# Patient Record
Sex: FEMALE | Race: WHITE | NOT HISPANIC OR LATINO | Employment: FULL TIME | URBAN - METROPOLITAN AREA
[De-identification: names, ages, dates, MRNs, and addresses within clinical notes are randomized per-mention and may not be internally consistent; named-entity substitution may affect disease eponyms.]

---

## 2017-10-03 ENCOUNTER — APPOINTMENT (OUTPATIENT)
Dept: LAB | Facility: HOSPITAL | Age: 57
End: 2017-10-03
Payer: COMMERCIAL

## 2017-10-03 ENCOUNTER — TRANSCRIBE ORDERS (OUTPATIENT)
Dept: ADMINISTRATIVE | Facility: HOSPITAL | Age: 57
End: 2017-10-03

## 2017-10-03 DIAGNOSIS — E03.9 UNSPECIFIED HYPOTHYROIDISM: ICD-10-CM

## 2017-10-03 DIAGNOSIS — E03.9 UNSPECIFIED HYPOTHYROIDISM: Primary | ICD-10-CM

## 2017-10-03 LAB
T4 FREE SERPL-MCNC: 1.29 NG/DL (ref 0.76–1.46)
TSH SERPL DL<=0.05 MIU/L-ACNC: 0.02 UIU/ML (ref 0.36–3.74)

## 2017-10-03 PROCEDURE — 36415 COLL VENOUS BLD VENIPUNCTURE: CPT

## 2017-10-03 PROCEDURE — 84443 ASSAY THYROID STIM HORMONE: CPT

## 2017-10-03 PROCEDURE — 84439 ASSAY OF FREE THYROXINE: CPT

## 2017-12-14 DIAGNOSIS — Z12.31 ENCOUNTER FOR SCREENING MAMMOGRAM FOR MALIGNANT NEOPLASM OF BREAST: ICD-10-CM

## 2018-01-12 NOTE — PROGRESS NOTES
Assessment    1  Callus (700) (L84)   2  Acquired ankle/foot deformity (736 70) (M21 969)   3  Hallux valgus (735 0) (M20 10)   4  Pes planus, congenital (754 61) (Q66 50)   5  Onychomycosis (110 1) (B35 1)    Plan    · Terbinafine HCl - 250 MG Oral Tablet; one tab po qod   · Follow-up visit in 6 weeks Evaluation and Treatment  Follow-up  Status: Hold For -  Scheduling  Requested for: 65IYJ3669   · It is important to take good care of your feet ; Status:Complete;   Done: 11RGL2994  09:26AM   · You can treat and prevent ingrown toenails ; Status:Complete;   Done: 59IJZ2640  09:26AM   · Orthotics B/L; Status:Complete;   Done: 18LPC1036 09:26AM    Discussion/Summary  Possible side effects of new medications were reviewed with the patient/guardian today  The treatment plan was reviewed with the patient/guardian  The patient/guardian understands and agrees with the treatment plan   The patient was counseled regarding instructions for management, prognosis, patient and family education, risks and benefits of treatment options, importance of compliance with treatment  History of Present Illness  HPI: Patient is seen on referral  Patient has pain in her feet with ambulation  Patient is concerned with her bunions  In addition, patient is concerned with mycosis of right big toenail  Review of Systems    Constitutional: No fever, no chills, feels well, no tiredness, no recent weight gain or weight loss  Eyes: No complaints of eye pain, no red eyes, no eyesight problems, no discharge, no dry eyes, no itching of eyes  ENT: no complaints of earache, no loss of hearing, no nose bleeds, no nasal discharge, no sore throat, no hoarseness  Cardiovascular: No complaints of slow heart rate, no fast heart rate, no chest pain, no palpitations, no leg claudication, no lower extremity edema  Respiratory: No complaints of shortness of breath, no wheezing, no cough, no SOB on exertion, no orthopnea, no PND  Gastrointestinal: No complaints of abdominal pain, no constipation, no nausea or vomiting, no diarrhea, no bloody stools  Genitourinary: No complaints of dysuria, no incontinence, no pelvic pain, no dysmenorrhea, no vaginal discharge or bleeding  Musculoskeletal: No complaints of arthralgias, no myalgias, no joint swelling or stiffness, no limb pain or swelling  Active Problems    1  _ (V72 3)   2  Abnormal mammogram (793 80) (R92 8)   3  Benign essential hypertension (401 1) (I10)   4  Hypothyroidism (244 9) (E03 9)   5  Mixed hyperlipidemia (272 2) (E78 2)   6  Other screening mammogram (V76 12) (Z12 31)    Past Medical History    · History of hypertension (V12 59) (Z86 79)   · History of mixed hyperlipidemia (V12 29) (Z86 39)    Surgical History    · History of Oral Surgery Tooth Extraction    The surgical history was reviewed and updated today  Family History    · Family history of Mixed Hyperlipoproteinemia    The family history was reviewed and updated today  Social History    · Never A Smoker  The social history was reviewed and updated today  Current Meds   1  Yamilet 5-20 MG Oral Tablet; Take 1 tablet daily; Therapy: 53SIH0296 to (Last Rx:13Zuv6488)  Requested for: 92Pvm6550 Ordered   2  Fish Oil 1000 MG Oral Capsule; as directed; Therapy: 23YXG1026 to (Last Rx:22Nov2013) Ordered   3  Synthroid 125 MCG Oral Tablet (Levothyroxine Sodium); 1 Every Day; Therapy: 40Ixw7013 to  Requested for: 22Nov2013 Recorded    Allergies    1  No Known Drug Allergies   2  No Known Drug Allergies    Physical Exam  Left Foot: Appearance: Normal except as noted: excessive pronation and pes planus  Great toe deformities include a bunion  Tenderness: None except the great toe  Right Foot: Appearance: Normal except as noted: excessive pronation and pes planus  Great toe deformities include a bunion  Tenderness: None  Neurological Exam: performed  Light touch was intact bilaterally   Vibratory sensation was intact bilaterally  Response to monofilament test was intact bilaterally  Deep tendon reflexes: patellar reflex present bilaterally and achilles reflex present bilaterally  Vascular Exam: performed Dorsalis pedis pulses were present bilaterally  Posterior tibial pulses were present bilaterally  Elevation Pallor: absent bilaterally  Dependence rubor was absent bilaterally  Edema: none  Toenails: All of the toenails were elongated and hypertrophied  The right first toenail was shown to have subungual debris and Might  Hyperkeratosis: present on both first toes and present on both second sub metatarsals  Shoe Gear Evaluation: performed ()  Recommendation(s): custom inlays  Procedure  Patient was educated on the nature bunions and their care  Patient's feet were casted for custom molded foot orthotics  We will try this first prior to surgical intervention  An empiric course of terbinafine is been ordered  Distal be taken every other day        Future Appointments    Date/Time Provider Specialty Site   03/15/2016 01:15 PM Anais Galvin DPM [facility]iat[facility]     Signatures   Electronically signed by : Kenan Mast DPM; Feb  3 2016  9:27AM EST                       (Author)

## 2018-01-16 NOTE — PROGRESS NOTES
Assessment    1  Callus (700) (L84)   2  Acquired ankle/foot deformity (736 70) (M21 969)   3  Hallux valgus (735 0) (M20 10)   4  Pes planus, congenital (754 61) (Q66 50)   5  Onychomycosis (110 1) (B35 1)    Plan    · Terbinafine HCl - 250 MG Oral Tablet; one tab po qod   · Follow-up visit in 6 weeks Evaluation and Treatment  Follow-up  Status: Hold For -  Scheduling  Requested for: 51UEI0610   · It is important to take good care of your feet ; Status:Complete;   Done: 34GOO5008   · You can treat and prevent ingrown toenails ; Status:Complete;   Done: 24IJI6155   · Orthotics B/L; Status:Complete;   Done: 61YIR3960    Discussion/Summary  Possible side effects of new medications were reviewed with the patient/guardian today  The treatment plan was reviewed with the patient/guardian  The patient/guardian understands and agrees with the treatment plan   The patient was counseled regarding instructions for management, prognosis, patient and family education, risks and benefits of treatment options, importance of compliance with treatment  Chief Complaint  npt se scanned note      History of Present Illness  HPI: Patient is seen on referral  Patient has pain in her feet with ambulation  Patient is concerned with her bunions  In addition, patient is concerned with mycosis of right big toenail  Review of Systems    Constitutional: No fever, no chills, feels well, no tiredness, no recent weight gain or weight loss  Eyes: No complaints of eye pain, no red eyes, no eyesight problems, no discharge, no dry eyes, no itching of eyes  ENT: no complaints of earache, no loss of hearing, no nose bleeds, no nasal discharge, no sore throat, no hoarseness  Cardiovascular: No complaints of slow heart rate, no fast heart rate, no chest pain, no palpitations, no leg claudication, no lower extremity edema  Respiratory: No complaints of shortness of breath, no wheezing, no cough, no SOB on exertion, no orthopnea, no PND  Gastrointestinal: No complaints of abdominal pain, no constipation, no nausea or vomiting, no diarrhea, no bloody stools  Genitourinary: No complaints of dysuria, no incontinence, no pelvic pain, no dysmenorrhea, no vaginal discharge or bleeding  Musculoskeletal: No complaints of arthralgias, no myalgias, no joint swelling or stiffness, no limb pain or swelling  Active Problems    1  _ (V72 3)   2  Abnormal mammogram (793 80) (R92 8)   3  Acquired ankle/foot deformity (736 70) (M21 969)   4  Benign essential hypertension (401 1) (I10)   5  Callus (700) (L84)   6  Hallux valgus (735 0) (M20 10)   7  Hypothyroidism (244 9) (E03 9)   8  Mixed hyperlipidemia (272 2) (E78 2)   9  Onychomycosis (110 1) (B35 1)   10  Other screening mammogram (V76 12) (Z12 31)   11  Pes planus, congenital (754 61) (Q66 50)    Past Medical History    · History of hypertension (V12 59) (Z86 79)   · History of mixed hyperlipidemia (V12 29) (Z86 39)    Surgical History    · History of Oral Surgery Tooth Extraction    The surgical history was reviewed and updated today  Family History    · Family history of Mixed Hyperlipoproteinemia    The family history was reviewed and updated today  Social History    · Never A Smoker  The social history was reviewed and updated today  Current Meds   1  Yamilet 5-20 MG Oral Tablet; Take 1 tablet daily; Therapy: 48TMG1797 to (Last Rx:24Ghe8464)  Requested for: 75Hxk9864 Ordered   2  Fish Oil 1000 MG Oral Capsule; as directed; Therapy: 01DXX2254 to (Last Rx:22Nov2013) Ordered   3  Synthroid 125 MCG Oral Tablet; 1 Every Day; Therapy: 78Zsk7684 to  Requested for: 22Nov2013 Recorded    Allergies    1  No Known Drug Allergies   2  No Known Drug Allergies    Vitals   Recorded: 64AVK6739 09:28AM   Height 5 ft 5 in   Weight 152 lb    BMI Calculated 25 29   BSA Calculated 1 76     Physical Exam  Left Foot: Appearance: Normal except as noted: excessive pronation and pes planus   Great toe deformities include a bunion  Tenderness: None except the great toe  Right Foot: Appearance: Normal except as noted: excessive pronation and pes planus  Great toe deformities include a bunion  Tenderness: None  Neurological Exam: performed  Light touch was intact bilaterally  Vibratory sensation was intact bilaterally  Response to monofilament test was intact bilaterally  Deep tendon reflexes: patellar reflex present bilaterally and achilles reflex present bilaterally  Vascular Exam: performed Dorsalis pedis pulses were present bilaterally  Posterior tibial pulses were present bilaterally  Elevation Pallor: absent bilaterally  Dependence rubor was absent bilaterally  Edema: none  Toenails: All of the toenails were elongated and hypertrophied  The right first toenail was shown to have subungual debris and Might  Hyperkeratosis: present on both first toes and present on both second sub metatarsals  Shoe Gear Evaluation: performed ()  Recommendation(s): custom inlays  Procedure  Patient was educated on the nature bunions and their care  Patient's feet were casted for custom molded foot orthotics  We will try this first prior to surgical intervention  An empiric course of terbinafine is been ordered  Distal be taken every other day        Future Appointments    Date/Time Provider Specialty Site   03/15/2016 01:15 PM Óscar Manzano DPM [facility]iat[facility]     Signatures   Electronically signed by : Bibiana Gonzales DPM; Feb 4 2016  9:54AM EST                       (Author)

## 2018-01-16 NOTE — RESULT NOTES
Verified Results  * MAMMO SCREENING BILATERAL W CAD [id number] 10:58AM [facility] Order Number: [id number]    - Patient Instructions: To schedule this appointment, please contact Central Scheduling at [phone]  Do not wear any perfume, powder, lotion or deodorant on breast or underarm area  Please bring your doctors order, referral (if needed) and insurance information with you on the day of the test  Failure to bring this information may result in this test being rescheduled  Arrive 15 minutes prior to your appointment time to register  On the day of your test, please bring any prior mammogram or breast studies with you that were not performed at a [facility]  Failure to bring prior exams may result in your test needing to be rescheduled  Test Name Result Flag Reference   MAMMO SCREENING BILATERAL W CAD (Report)     Patient History:   Patient is postmenopausal and has history of other cancer at age    30  Family history of unknown cancer in mother at age 48 or over  Patient's BMI is 26 6  Reason for exam: screening (asymptomatic)  Screening     Mammo Screening Bilateral W CAD: December 9, 2016 - Check In #:    [de-identified]   Bilateral CC and MLO view(s) were taken  Technologist: Ruth Gooden RT(R)(M)   Prior study comparison: November 12, 2015, [facility] diagnostic left    digital mammo, performed at [place] [address]  October 30, 2015, [facility] screening bilateral digital ab,    performed at [place] [address]  October 28, 2013, bilateral screening mammogram, performed at [place] [address]  February 7, 2012, bilateral screening    mammogram, performed at [place] [address]  September 17, 2010, bilateral screening mammogram, performed at    [place] [address]  January 6, 2009, screening    mammogram, performed at [place] [address]       The breast tissue is heterogeneously dense, potentially limiting    the sensitivity of mammography  Patient risk, included in this    report, assists in determining the appropriate screening regimen    (such as 3-D mammography or the inclusion of automated breast    ultrasound or MRI)  3-D mammography may also remain indicated as    screening  Bilateral digital mammography is performed  No    dominant soft tissue mass, architectural distortion or suspicious   calcifications are noted  The skin and nipple contours are    within normal limits  Scattered benign appearing calcifications    are noted  No evidence of malignancy  No significant changes when   compared to prior studies  1  No evidence of malignancy  2  No significant change when compared with the prior study  ASSESSMENT: BiRad:2 - Benign     Recommendation:   Routine screening mammogram of both breasts in 1 year  A reminder letter will be scheduled   Analyzed by CAD     8-10% of cancers will be missed on mammography  Management of a    palpable abnormality must be based on clinical grounds  Patients   will be notified of their results via letter from our facility  Accredited by [employer] of Radiology and FDA  Transcription Location: [facility] [address]: [id number]     Risk Value(s):   Tyrer-Cuzick 10 Year: 3 562%, Tyrer-Cuzick Lifetime: 11 683%,    Myriad Table: 1 5%, EMILEE 5 Year: 1 3%, NCI Lifetime: 9 1%   Signed by:   Debby Rios MD   12/9/16       Plan  Abnormal mammogram    · Digital Unilateral Diagnostic Mammogram With CAD ; every 1 year;  Last 83GTD6412;  Status:Active    Discussion/Summary   Mammo acceptable please repeat in a year

## 2018-02-03 DIAGNOSIS — I10 BENIGN ESSENTIAL HYPERTENSION: Primary | ICD-10-CM

## 2018-02-05 RX ORDER — AMLODIPINE AND OLMESARTAN MEDOXOMIL 5; 20 MG/1; MG/1
TABLET ORAL
Qty: 90 TABLET | Refills: 0 | Status: SHIPPED | OUTPATIENT
Start: 2018-02-05 | End: 2018-05-05 | Stop reason: SDUPTHER

## 2018-05-05 DIAGNOSIS — I10 BENIGN ESSENTIAL HYPERTENSION: ICD-10-CM

## 2018-05-07 RX ORDER — AMLODIPINE AND OLMESARTAN MEDOXOMIL 5; 20 MG/1; MG/1
TABLET ORAL
Qty: 90 TABLET | Refills: 0 | Status: SHIPPED | OUTPATIENT
Start: 2018-05-07 | End: 2018-08-04 | Stop reason: SDUPTHER

## 2018-08-04 DIAGNOSIS — I10 BENIGN ESSENTIAL HYPERTENSION: ICD-10-CM

## 2018-08-06 RX ORDER — AMLODIPINE AND OLMESARTAN MEDOXOMIL 5; 20 MG/1; MG/1
TABLET ORAL
Qty: 90 TABLET | Refills: 1 | Status: SHIPPED | OUTPATIENT
Start: 2018-08-06 | End: 2019-01-10 | Stop reason: SDUPTHER

## 2018-10-12 ENCOUNTER — TRANSCRIBE ORDERS (OUTPATIENT)
Dept: ADMINISTRATIVE | Facility: HOSPITAL | Age: 58
End: 2018-10-12

## 2018-10-12 ENCOUNTER — APPOINTMENT (OUTPATIENT)
Dept: LAB | Facility: HOSPITAL | Age: 58
End: 2018-10-12
Payer: COMMERCIAL

## 2018-10-12 DIAGNOSIS — I51.9 MYXEDEMA HEART DISEASE: ICD-10-CM

## 2018-10-12 DIAGNOSIS — I51.9 MYXEDEMA HEART DISEASE: Primary | ICD-10-CM

## 2018-10-12 DIAGNOSIS — E03.9 MYXEDEMA HEART DISEASE: Primary | ICD-10-CM

## 2018-10-12 DIAGNOSIS — E03.9 MYXEDEMA HEART DISEASE: ICD-10-CM

## 2018-10-12 LAB
T4 FREE SERPL-MCNC: 1.51 NG/DL (ref 0.76–1.46)
TSH SERPL DL<=0.05 MIU/L-ACNC: 0.02 UIU/ML (ref 0.36–3.74)

## 2018-10-12 PROCEDURE — 84439 ASSAY OF FREE THYROXINE: CPT

## 2018-10-12 PROCEDURE — 84443 ASSAY THYROID STIM HORMONE: CPT

## 2018-10-12 PROCEDURE — 36415 COLL VENOUS BLD VENIPUNCTURE: CPT

## 2018-11-27 ENCOUNTER — HOSPITAL ENCOUNTER (OUTPATIENT)
Dept: RADIOLOGY | Facility: HOSPITAL | Age: 58
Discharge: HOME | End: 2018-11-27
Attending: FAMILY MEDICINE
Payer: COMMERCIAL

## 2018-11-27 ENCOUNTER — TRANSCRIBE ORDERS (OUTPATIENT)
Dept: ADMINISTRATIVE | Facility: HOSPITAL | Age: 58
End: 2018-11-27

## 2018-11-27 ENCOUNTER — APPOINTMENT (OUTPATIENT)
Dept: LAB | Facility: HOSPITAL | Age: 58
End: 2018-11-27
Payer: COMMERCIAL

## 2018-11-27 VITALS — HEIGHT: 65 IN | BODY MASS INDEX: 25.83 KG/M2 | WEIGHT: 155 LBS

## 2018-11-27 DIAGNOSIS — E03.9 HYPOTHYROIDISM, UNSPECIFIED TYPE: Primary | ICD-10-CM

## 2018-11-27 DIAGNOSIS — E03.9 HYPOTHYROIDISM, UNSPECIFIED TYPE: ICD-10-CM

## 2018-11-27 DIAGNOSIS — Z12.31 ENCOUNTER FOR SCREENING MAMMOGRAM FOR MALIGNANT NEOPLASM OF BREAST: ICD-10-CM

## 2018-11-27 LAB
T4 FREE SERPL-MCNC: 1.26 NG/DL (ref 0.76–1.46)
TSH SERPL DL<=0.05 MIU/L-ACNC: 0.02 UIU/ML (ref 0.36–3.74)

## 2018-11-27 PROCEDURE — 86376 MICROSOMAL ANTIBODY EACH: CPT

## 2018-11-27 PROCEDURE — 77063 BREAST TOMOSYNTHESIS BI: CPT

## 2018-11-27 PROCEDURE — 77067 SCR MAMMO BI INCL CAD: CPT

## 2018-11-27 PROCEDURE — 36415 COLL VENOUS BLD VENIPUNCTURE: CPT

## 2018-11-27 PROCEDURE — 84443 ASSAY THYROID STIM HORMONE: CPT

## 2018-11-27 PROCEDURE — 86800 THYROGLOBULIN ANTIBODY: CPT

## 2018-11-27 PROCEDURE — 84439 ASSAY OF FREE THYROXINE: CPT

## 2018-11-28 LAB
THYROGLOB AB SERPL-ACNC: 6.2 IU/ML (ref 0–0.9)
THYROPEROXIDASE AB SERPL-ACNC: 123 IU/ML (ref 0–34)

## 2019-01-10 ENCOUNTER — OFFICE VISIT (OUTPATIENT)
Dept: FAMILY MEDICINE CLINIC | Facility: CLINIC | Age: 59
End: 2019-01-10
Payer: COMMERCIAL

## 2019-01-10 ENCOUNTER — HOSPITAL ENCOUNTER (OUTPATIENT)
Dept: RADIOLOGY | Facility: HOSPITAL | Age: 59
Discharge: HOME | End: 2019-01-10
Attending: FAMILY MEDICINE
Payer: COMMERCIAL

## 2019-01-10 VITALS
TEMPERATURE: 97.5 F | SYSTOLIC BLOOD PRESSURE: 114 MMHG | HEART RATE: 84 BPM | HEIGHT: 65 IN | BODY MASS INDEX: 25.33 KG/M2 | DIASTOLIC BLOOD PRESSURE: 62 MMHG | RESPIRATION RATE: 16 BRPM | WEIGHT: 152 LBS

## 2019-01-10 DIAGNOSIS — I10 BENIGN ESSENTIAL HYPERTENSION: ICD-10-CM

## 2019-01-10 DIAGNOSIS — N30.01 ACUTE CYSTITIS WITH HEMATURIA: Primary | ICD-10-CM

## 2019-01-10 DIAGNOSIS — R31.0 GROSS HEMATURIA: ICD-10-CM

## 2019-01-10 DIAGNOSIS — M25.552 LEFT HIP PAIN: ICD-10-CM

## 2019-01-10 LAB
SL AMB  POCT GLUCOSE, UA: ABNORMAL
SL AMB LEUKOCYTE ESTERASE,UA: ABNORMAL
SL AMB POCT BILIRUBIN,UA: ABNORMAL
SL AMB POCT BLOOD,UA: 250
SL AMB POCT CLARITY,UA: CLEAR
SL AMB POCT COLOR,UA: ABNORMAL
SL AMB POCT KETONES,UA: ABNORMAL
SL AMB POCT NITRITE,UA: ABNORMAL
SL AMB POCT PH,UA: 8
SL AMB POCT SPECIFIC GRAVITY,UA: 1
SL AMB POCT URINE PROTEIN: ABNORMAL
SL AMB POCT UROBILINOGEN: ABNORMAL

## 2019-01-10 PROCEDURE — 1036F TOBACCO NON-USER: CPT | Performed by: FAMILY MEDICINE

## 2019-01-10 PROCEDURE — 99214 OFFICE O/P EST MOD 30 MIN: CPT | Performed by: FAMILY MEDICINE

## 2019-01-10 PROCEDURE — 87077 CULTURE AEROBIC IDENTIFY: CPT | Performed by: FAMILY MEDICINE

## 2019-01-10 PROCEDURE — 87186 SC STD MICRODIL/AGAR DIL: CPT | Performed by: FAMILY MEDICINE

## 2019-01-10 PROCEDURE — 81003 URINALYSIS AUTO W/O SCOPE: CPT | Performed by: FAMILY MEDICINE

## 2019-01-10 PROCEDURE — 3008F BODY MASS INDEX DOCD: CPT | Performed by: FAMILY MEDICINE

## 2019-01-10 PROCEDURE — 87086 URINE CULTURE/COLONY COUNT: CPT | Performed by: FAMILY MEDICINE

## 2019-01-10 PROCEDURE — 76770 US EXAM ABDO BACK WALL COMP: CPT

## 2019-01-10 PROCEDURE — 3074F SYST BP LT 130 MM HG: CPT | Performed by: FAMILY MEDICINE

## 2019-01-10 PROCEDURE — 3078F DIAST BP <80 MM HG: CPT | Performed by: FAMILY MEDICINE

## 2019-01-10 RX ORDER — SULFAMETHOXAZOLE AND TRIMETHOPRIM 800; 160 MG/1; MG/1
1 TABLET ORAL EVERY 12 HOURS SCHEDULED
Qty: 6 TABLET | Refills: 0 | Status: SHIPPED | OUTPATIENT
Start: 2019-01-10 | End: 2019-01-13

## 2019-01-10 RX ORDER — LEVOTHYROXINE SODIUM 0.12 MG/1
TABLET ORAL DAILY
COMMUNITY
Start: 2010-12-13

## 2019-01-10 RX ORDER — ASPIRIN 81 MG/1
81 TABLET ORAL DAILY
COMMUNITY

## 2019-01-10 RX ORDER — AMLODIPINE AND OLMESARTAN MEDOXOMIL 5; 20 MG/1; MG/1
1 TABLET ORAL DAILY
Qty: 90 TABLET | Refills: 1 | Status: SHIPPED | OUTPATIENT
Start: 2019-01-10 | End: 2019-07-09 | Stop reason: SDUPTHER

## 2019-01-10 NOTE — PROGRESS NOTES
Assessment/Plan:    Problem List Items Addressed This Visit     Benign essential hypertension     stable         Relevant Medications    amlodipine-olmesartan (CALVIN) 5-20 MG    Left hip pain      Other Visit Diagnoses     Acute cystitis with hematuria    -  Primary    Relevant Medications    sulfamethoxazole-trimethoprim (BACTRIM DS) 800-160 mg per tablet    Other Relevant Orders    Urine culture    POCT urine dip auto non-scope    Gross hematuria        Relevant Orders    US kidney and bladder    POCT urine dip auto non-scope          There are no Patient Instructions on file for this visit  Return for Next scheduled follow up  Subjective:      Patient ID: Adelso Hinojosa is a 63 y o  female  Chief Complaint   Patient presents with    Blood in Urine     lj       Pt states she had blood in her urine yesterday  Had it as well at the end of august, seen by obgyn, placed on abx  Pt with dysuria  Urgency    While she is here she states since menopause she has been having hot flashes    Pt states she was also having a pain in her left flank mariella her ilium, states its an ache  This pain started a year ago when her menopause started  States its on and off  Does not have it now  Had it 3-4 days ago  Blood in Urine   Associated symptoms include dysuria  Pertinent negatives include no abdominal pain or chills  The following portions of the patient's history were reviewed and updated as appropriate: allergies, current medications, past family history, past medical history, past social history, past surgical history and problem list     Review of Systems   Constitutional: Negative  Negative for activity change, appetite change, chills, diaphoresis and fatigue  HENT: Negative  Negative for dental problem, ear pain, sinus pressure and sore throat  Eyes: Negative  Negative for photophobia, pain, discharge, redness, itching and visual disturbance     Respiratory: Negative for apnea and chest tightness  Cardiovascular: Negative  Negative for chest pain, palpitations and leg swelling  Gastrointestinal: Negative  Negative for abdominal distention, abdominal pain, constipation and diarrhea  Endocrine: Negative  Negative for cold intolerance and heat intolerance  Genitourinary: Positive for dysuria and hematuria  Negative for difficulty urinating and dyspareunia  Musculoskeletal: Positive for arthralgias  Negative for back pain  Skin: Negative  Allergic/Immunologic: Negative for environmental allergies  Neurological: Negative  Negative for dizziness  Psychiatric/Behavioral: Negative  Negative for agitation  Current Outpatient Prescriptions   Medication Sig Dispense Refill    amlodipine-olmesartan (CALVIN) 5-20 MG Take 1 tablet by mouth daily 90 tablet 1    aspirin (ECOTRIN LOW STRENGTH) 81 mg EC tablet Take 81 mg by mouth daily      levothyroxine (SYNTHROID) 125 mcg tablet Take by mouth daily      sulfamethoxazole-trimethoprim (BACTRIM DS) 800-160 mg per tablet Take 1 tablet by mouth every 12 (twelve) hours for 3 days 6 tablet 0     No current facility-administered medications for this visit  Objective:    /62   Pulse 84   Temp 97 5 °F (36 4 °C)   Resp 16   Ht 5' 5" (1 651 m)   Wt 68 9 kg (152 lb)   BMI 25 29 kg/m²        Physical Exam   Constitutional: She appears well-developed and well-nourished  No distress  HENT:   Head: Normocephalic and atraumatic  Right Ear: External ear normal    Left Ear: External ear normal    Nose: Nose normal    Mouth/Throat: Oropharynx is clear and moist  No oropharyngeal exudate  Eyes: Pupils are equal, round, and reactive to light  EOM are normal  Right eye exhibits no discharge  Left eye exhibits no discharge  No scleral icterus  Neck: No thyromegaly present  Cardiovascular: Normal rate and normal heart sounds  No murmur heard  Pulmonary/Chest: Effort normal and breath sounds normal  No respiratory distress  She has no wheezes  Abdominal: Soft  Bowel sounds are normal  She exhibits no distension and no mass  There is no tenderness  There is no rebound and no guarding  Musculoskeletal: Normal range of motion  Neurological: She is alert  She displays normal reflexes  Coordination normal    Skin: Skin is warm and dry  No rash noted  She is not diaphoretic  No erythema  Psychiatric: She has a normal mood and affect  Her behavior is normal    Nursing note and vitals reviewed               Jose Richardson DO

## 2019-01-12 LAB
BACTERIA UR CULT: ABNORMAL
BACTERIA UR CULT: ABNORMAL

## 2019-01-14 DIAGNOSIS — N39.0 URINARY TRACT INFECTION WITH HEMATURIA, SITE UNSPECIFIED: Primary | ICD-10-CM

## 2019-01-14 DIAGNOSIS — R31.9 URINARY TRACT INFECTION WITH HEMATURIA, SITE UNSPECIFIED: Primary | ICD-10-CM

## 2019-01-14 RX ORDER — CIPROFLOXACIN 500 MG/1
500 TABLET, FILM COATED ORAL EVERY 12 HOURS SCHEDULED
Qty: 6 TABLET | Refills: 0 | Status: SHIPPED | OUTPATIENT
Start: 2019-01-14 | End: 2019-01-17

## 2019-01-14 RX ORDER — CIPROFLOXACIN 500 MG/1
500 TABLET, FILM COATED ORAL EVERY 12 HOURS SCHEDULED
Qty: 6 TABLET | Refills: 0 | Status: SHIPPED | OUTPATIENT
Start: 2019-01-14 | End: 2019-01-14 | Stop reason: SDUPTHER

## 2019-01-14 NOTE — TELEPHONE ENCOUNTER
Called pt to discuss ultrasound of the urogenital tract and urine culture  Left message for pt to call back  I would like to speak with her  Until I do however please let her knoe the culture is positive but the bacteria that grew out are resistant to the abx gvscott I will escribe a different abx to the pharm   And I would like to talk with her

## 2019-01-15 PROBLEM — D17.71 ANGIOMYOLIPOMA OF BOTH KIDNEYS: Status: ACTIVE | Noted: 2019-01-15

## 2019-01-15 NOTE — TELEPHONE ENCOUNTER
Can you please send this script to [facility]  And can you send this message back please so I can write a message back to Dr Beecher Riddles [place], [place]

## 2019-01-15 NOTE — TELEPHONE ENCOUNTER
Pt aware she will need to change her abx to CIPRO  I had to have Dr Yaniv Linda resend to her retail pharmacy and I am canceling script from her mail order  Pt states that the best time to get a hold of her is between 8 AM and 930 AM  Pt aware you are not in office until tomorrow   Diana Lewis

## 2019-01-15 NOTE — TELEPHONE ENCOUNTER
Called pt discussed results with her discuss angiomyolipomas, her sister works for a nephrologist which she will run this by her sisters doc    I suggested urologist   If she does not get what she needs she will call me and get name of urologist

## 2019-01-15 NOTE — TELEPHONE ENCOUNTER
Lisa Hewitt returning Dr Yaw Morton phone number Can Dr Yaw Morton call around noon if possible  [phone]

## 2019-07-09 DIAGNOSIS — I10 BENIGN ESSENTIAL HYPERTENSION: ICD-10-CM

## 2019-07-09 RX ORDER — AMLODIPINE AND OLMESARTAN MEDOXOMIL 5; 20 MG/1; MG/1
TABLET ORAL
Qty: 90 TABLET | Refills: 1 | Status: SHIPPED | OUTPATIENT
Start: 2019-07-09 | End: 2020-01-05 | Stop reason: SDUPTHER

## 2019-11-11 ENCOUNTER — TRANSCRIBE ORDERS (OUTPATIENT)
Dept: ADMINISTRATIVE | Facility: HOSPITAL | Age: 59
End: 2019-11-11

## 2019-11-11 DIAGNOSIS — Z12.31 ENCOUNTER FOR SCREENING MAMMOGRAM FOR MALIGNANT NEOPLASM OF BREAST: Primary | ICD-10-CM

## 2019-12-03 ENCOUNTER — HOSPITAL ENCOUNTER (OUTPATIENT)
Dept: RADIOLOGY | Facility: HOSPITAL | Age: 59
Discharge: HOME | End: 2019-12-03
Attending: OBSTETRICS & GYNECOLOGY
Payer: COMMERCIAL

## 2019-12-03 VITALS — BODY MASS INDEX: 27.97 KG/M2 | WEIGHT: 152 LBS | HEIGHT: 62 IN

## 2019-12-03 DIAGNOSIS — Z12.31 ENCOUNTER FOR SCREENING MAMMOGRAM FOR MALIGNANT NEOPLASM OF BREAST: ICD-10-CM

## 2019-12-03 PROCEDURE — 77063 BREAST TOMOSYNTHESIS BI: CPT

## 2019-12-03 PROCEDURE — 77067 SCR MAMMO BI INCL CAD: CPT

## 2020-01-05 DIAGNOSIS — I10 BENIGN ESSENTIAL HYPERTENSION: ICD-10-CM

## 2020-01-05 RX ORDER — AMLODIPINE AND OLMESARTAN MEDOXOMIL 5; 20 MG/1; MG/1
TABLET ORAL
Qty: 90 TABLET | Refills: 0 | Status: SHIPPED | OUTPATIENT
Start: 2020-01-05 | End: 2020-04-06

## 2020-01-23 ENCOUNTER — TRANSCRIBE ORDERS (OUTPATIENT)
Dept: ADMINISTRATIVE | Facility: HOSPITAL | Age: 60
End: 2020-01-23

## 2020-01-23 DIAGNOSIS — R31.0 GROSS HEMATURIA: Primary | ICD-10-CM

## 2020-01-24 ENCOUNTER — HOSPITAL ENCOUNTER (OUTPATIENT)
Dept: RADIOLOGY | Facility: HOSPITAL | Age: 60
Discharge: HOME | End: 2020-01-24
Attending: UROLOGY
Payer: COMMERCIAL

## 2020-01-24 DIAGNOSIS — R31.0 GROSS HEMATURIA: ICD-10-CM

## 2020-01-24 PROCEDURE — 76770 US EXAM ABDO BACK WALL COMP: CPT

## 2020-02-13 ENCOUNTER — APPOINTMENT (OUTPATIENT)
Dept: LAB | Facility: HOSPITAL | Age: 60
End: 2020-02-13
Payer: COMMERCIAL

## 2020-02-13 ENCOUNTER — TRANSCRIBE ORDERS (OUTPATIENT)
Dept: ADMINISTRATIVE | Facility: HOSPITAL | Age: 60
End: 2020-02-13

## 2020-02-13 DIAGNOSIS — C73 MALIGNANT NEOPLASM OF THYROID GLAND (HCC): Primary | ICD-10-CM

## 2020-02-13 DIAGNOSIS — C73 MALIGNANT NEOPLASM OF THYROID GLAND (HCC): ICD-10-CM

## 2020-02-13 LAB
T3 SERPL-MCNC: 1.2 NG/ML (ref 0.6–1.8)
T4 FREE SERPL-MCNC: 1.68 NG/DL (ref 0.76–1.46)
TSH SERPL DL<=0.05 MIU/L-ACNC: 0.04 UIU/ML (ref 0.36–3.74)

## 2020-02-13 PROCEDURE — 84432 ASSAY OF THYROGLOBULIN: CPT

## 2020-02-13 PROCEDURE — 86800 THYROGLOBULIN ANTIBODY: CPT

## 2020-02-13 PROCEDURE — 84439 ASSAY OF FREE THYROXINE: CPT

## 2020-02-13 PROCEDURE — 36415 COLL VENOUS BLD VENIPUNCTURE: CPT

## 2020-02-13 PROCEDURE — 84443 ASSAY THYROID STIM HORMONE: CPT

## 2020-02-13 PROCEDURE — 84480 ASSAY TRIIODOTHYRONINE (T3): CPT

## 2020-02-19 LAB
THYROGLOB AB SERPL-ACNC: 14.2 IU/ML (ref 0–0.9)
THYROGLOB SERPL-MCNC: 2.6 NG/ML

## 2020-02-26 ENCOUNTER — TRANSCRIBE ORDERS (OUTPATIENT)
Dept: ADMINISTRATIVE | Facility: HOSPITAL | Age: 60
End: 2020-02-26

## 2020-02-26 DIAGNOSIS — E04.1 THYROID NODULE: Primary | ICD-10-CM

## 2020-02-27 ENCOUNTER — HOSPITAL ENCOUNTER (OUTPATIENT)
Dept: RADIOLOGY | Facility: HOSPITAL | Age: 60
Discharge: HOME | End: 2020-02-27
Payer: COMMERCIAL

## 2020-02-27 DIAGNOSIS — E04.1 THYROID NODULE: ICD-10-CM

## 2020-02-27 PROCEDURE — 76536 US EXAM OF HEAD AND NECK: CPT

## 2020-04-04 DIAGNOSIS — I10 BENIGN ESSENTIAL HYPERTENSION: ICD-10-CM

## 2020-04-06 RX ORDER — AMLODIPINE AND OLMESARTAN MEDOXOMIL 5; 20 MG/1; MG/1
TABLET ORAL
Qty: 90 TABLET | Refills: 0 | Status: SHIPPED | OUTPATIENT
Start: 2020-04-06 | End: 2020-07-06

## 2020-07-03 DIAGNOSIS — I10 BENIGN ESSENTIAL HYPERTENSION: ICD-10-CM

## 2020-07-06 RX ORDER — AMLODIPINE AND OLMESARTAN MEDOXOMIL 5; 20 MG/1; MG/1
TABLET ORAL
Qty: 90 TABLET | Refills: 3 | Status: SHIPPED | OUTPATIENT
Start: 2020-07-06 | End: 2021-06-01

## 2020-07-17 ENCOUNTER — OFFICE VISIT (OUTPATIENT)
Dept: FAMILY MEDICINE CLINIC | Facility: CLINIC | Age: 60
End: 2020-07-17
Payer: COMMERCIAL

## 2020-07-17 ENCOUNTER — TELEPHONE (OUTPATIENT)
Dept: ADMINISTRATIVE | Facility: OTHER | Age: 60
End: 2020-07-17

## 2020-07-17 ENCOUNTER — TRANSCRIBE ORDERS (OUTPATIENT)
Dept: ADMINISTRATIVE | Facility: HOSPITAL | Age: 60
End: 2020-07-17

## 2020-07-17 ENCOUNTER — APPOINTMENT (OUTPATIENT)
Dept: LAB | Facility: HOSPITAL | Age: 60
End: 2020-07-17
Attending: FAMILY MEDICINE
Payer: COMMERCIAL

## 2020-07-17 VITALS
HEIGHT: 65 IN | SYSTOLIC BLOOD PRESSURE: 120 MMHG | BODY MASS INDEX: 24.99 KG/M2 | HEART RATE: 62 BPM | WEIGHT: 150 LBS | RESPIRATION RATE: 16 BRPM | TEMPERATURE: 98.3 F | DIASTOLIC BLOOD PRESSURE: 70 MMHG

## 2020-07-17 DIAGNOSIS — I10 BENIGN ESSENTIAL HYPERTENSION: ICD-10-CM

## 2020-07-17 DIAGNOSIS — Z23 NEED FOR VACCINATION: ICD-10-CM

## 2020-07-17 DIAGNOSIS — E03.9 HYPOTHYROIDISM, UNSPECIFIED TYPE: ICD-10-CM

## 2020-07-17 DIAGNOSIS — E78.2 MIXED HYPERLIPIDEMIA: ICD-10-CM

## 2020-07-17 DIAGNOSIS — Z11.59 NEED FOR HEPATITIS C SCREENING TEST: ICD-10-CM

## 2020-07-17 DIAGNOSIS — C73 MALIGNANT NEOPLASM OF THYROID GLAND (HCC): ICD-10-CM

## 2020-07-17 DIAGNOSIS — D17.71 ANGIOMYOLIPOMA OF BOTH KIDNEYS: ICD-10-CM

## 2020-07-17 DIAGNOSIS — C73 MALIGNANT NEOPLASM OF THYROID GLAND (HCC): Primary | ICD-10-CM

## 2020-07-17 DIAGNOSIS — Z00.00 WELL ADULT EXAM: Primary | ICD-10-CM

## 2020-07-17 LAB
ALBUMIN SERPL BCP-MCNC: 3.8 G/DL (ref 3.5–5)
ALP SERPL-CCNC: 65 U/L (ref 46–116)
ALT SERPL W P-5'-P-CCNC: 27 U/L (ref 12–78)
ANION GAP SERPL CALCULATED.3IONS-SCNC: 8 MMOL/L (ref 4–13)
AST SERPL W P-5'-P-CCNC: 20 U/L (ref 5–45)
BILIRUB SERPL-MCNC: 0.5 MG/DL (ref 0.2–1)
BUN SERPL-MCNC: 11 MG/DL (ref 5–25)
CALCIUM SERPL-MCNC: 8.4 MG/DL (ref 8.3–10.1)
CHLORIDE SERPL-SCNC: 102 MMOL/L (ref 100–108)
CHOLEST SERPL-MCNC: 185 MG/DL (ref 50–200)
CO2 SERPL-SCNC: 28 MMOL/L (ref 21–32)
CREAT SERPL-MCNC: 0.81 MG/DL (ref 0.6–1.3)
GFR SERPL CREATININE-BSD FRML MDRD: 80 ML/MIN/1.73SQ M
GLUCOSE P FAST SERPL-MCNC: 88 MG/DL (ref 65–99)
HCV AB SER QL: NORMAL
HDLC SERPL-MCNC: 83 MG/DL
LDLC SERPL CALC-MCNC: 95 MG/DL (ref 0–100)
POTASSIUM SERPL-SCNC: 4.1 MMOL/L (ref 3.5–5.3)
PROT SERPL-MCNC: 8.3 G/DL (ref 6.4–8.2)
SODIUM SERPL-SCNC: 138 MMOL/L (ref 136–145)
T3 SERPL-MCNC: 1 NG/ML (ref 0.6–1.8)
T4 FREE SERPL-MCNC: 1.55 NG/DL (ref 0.76–1.46)
TRIGL SERPL-MCNC: 37 MG/DL
TSH SERPL DL<=0.05 MIU/L-ACNC: 0.12 UIU/ML (ref 0.36–3.74)

## 2020-07-17 PROCEDURE — 90750 HZV VACC RECOMBINANT IM: CPT

## 2020-07-17 PROCEDURE — 84432 ASSAY OF THYROGLOBULIN: CPT

## 2020-07-17 PROCEDURE — 80053 COMPREHEN METABOLIC PANEL: CPT

## 2020-07-17 PROCEDURE — 86800 THYROGLOBULIN ANTIBODY: CPT

## 2020-07-17 PROCEDURE — 86803 HEPATITIS C AB TEST: CPT

## 2020-07-17 PROCEDURE — 84439 ASSAY OF FREE THYROXINE: CPT

## 2020-07-17 PROCEDURE — 84443 ASSAY THYROID STIM HORMONE: CPT

## 2020-07-17 PROCEDURE — 36415 COLL VENOUS BLD VENIPUNCTURE: CPT

## 2020-07-17 PROCEDURE — 3008F BODY MASS INDEX DOCD: CPT | Performed by: FAMILY MEDICINE

## 2020-07-17 PROCEDURE — 3074F SYST BP LT 130 MM HG: CPT | Performed by: FAMILY MEDICINE

## 2020-07-17 PROCEDURE — 99396 PREV VISIT EST AGE 40-64: CPT | Performed by: FAMILY MEDICINE

## 2020-07-17 PROCEDURE — 3078F DIAST BP <80 MM HG: CPT | Performed by: FAMILY MEDICINE

## 2020-07-17 PROCEDURE — 90471 IMMUNIZATION ADMIN: CPT

## 2020-07-17 PROCEDURE — 84480 ASSAY TRIIODOTHYRONINE (T3): CPT

## 2020-07-17 PROCEDURE — 80061 LIPID PANEL: CPT

## 2020-07-17 RX ORDER — LEVOTHYROXINE SODIUM 112 UG/1
112 TABLET ORAL DAILY
COMMUNITY
Start: 2020-06-21

## 2020-07-17 RX ORDER — NORETHINDRONE ACETATE AND ETHINYL ESTRADIOL 1; 5 MG/1; UG/1
TABLET ORAL
COMMUNITY
Start: 2020-04-24

## 2020-07-17 NOTE — TELEPHONE ENCOUNTER
Upon review of the In Basket request and the patient's chart, initial outreach has been made via fax, please see Contacts section for details  A second outreach attempt will be made within 5 business days      Thank you  Myriam Clark MA

## 2020-07-17 NOTE — LETTER
Procedure Request Form: Cologuard (performed approximately 2 years ago)      Date Requested: 20  Patient: Michael Bermudez  Patient : 1960   Referring Provider: Phylicia Acevedo, DO        Date of Procedure ______________________________       The above patient has informed us that they have completed their   most recent Cololguard at your facility  Please complete   this form and attach all corresponding procedure reports/results  Comments __________________________________________________________  ____________________________________________________________________  ____________________________________________________________________  ____________________________________________________________________    Facility Completing Procedure _________________________________________    Form Completed By (print name) _______________________________________      Signature __________________________________________________________      These reports are needed for  compliance    Please fax this completed form and a copy of the procedure report to our office located at [place] [address] as soon as possible to [phone] jaja Romero Kras: Phone [phone]    We thank you for your assistance in treating our mutual patient

## 2020-07-17 NOTE — TELEPHONE ENCOUNTER
----- Message from Lulú Redman DO sent at 7/17/2020 10:02 AM EDT -----  Regarding: colon  Pt states she did a cologuard, was not ordered by us, states the ins did it for her  She did it two years ago  Not sure where we would call for the results, maybe the ins?

## 2020-07-28 LAB
THYROGLOB AB SERPL-ACNC: 20.5 IU/ML (ref 0–0.9)
THYROGLOB SERPL-MCNC: <2 NG/ML

## 2020-08-11 NOTE — TELEPHONE ENCOUNTER
As a follow-up, a second attempt has been made for outreach via fax, please see Contacts section for details  A third and final attempt will be made within 5 business days      Thank you  Dmitriy Cassidy MA

## 2020-10-29 ENCOUNTER — TRANSCRIBE ORDERS (OUTPATIENT)
Dept: ADMINISTRATIVE | Facility: HOSPITAL | Age: 60
End: 2020-10-29

## 2020-10-29 DIAGNOSIS — Z12.31 ENCOUNTER FOR SCREENING MAMMOGRAM FOR MALIGNANT NEOPLASM OF BREAST: Primary | ICD-10-CM

## 2020-10-30 ENCOUNTER — CLINICAL SUPPORT (OUTPATIENT)
Dept: FAMILY MEDICINE CLINIC | Facility: CLINIC | Age: 60
End: 2020-10-30
Payer: COMMERCIAL

## 2020-10-30 VITALS — TEMPERATURE: 97.6 F

## 2020-10-30 DIAGNOSIS — Z23 NEED FOR VACCINATION: Primary | ICD-10-CM

## 2020-10-30 PROCEDURE — 90750 HZV VACC RECOMBINANT IM: CPT

## 2020-10-30 PROCEDURE — 90471 IMMUNIZATION ADMIN: CPT

## 2021-03-31 ENCOUNTER — IMMUNIZATIONS (OUTPATIENT)
Dept: FAMILY MEDICINE CLINIC | Facility: HOSPITAL | Age: 61
End: 2021-03-31

## 2021-03-31 DIAGNOSIS — Z23 ENCOUNTER FOR IMMUNIZATION: Primary | ICD-10-CM

## 2021-03-31 PROCEDURE — 91301 SARS-COV-2 / COVID-19 MRNA VACCINE (MODERNA) 100 MCG: CPT

## 2021-03-31 PROCEDURE — 0011A SARS-COV-2 / COVID-19 MRNA VACCINE (MODERNA) 100 MCG: CPT

## 2021-05-03 ENCOUNTER — IMMUNIZATIONS (OUTPATIENT)
Dept: FAMILY MEDICINE CLINIC | Facility: HOSPITAL | Age: 61
End: 2021-05-03

## 2021-05-03 DIAGNOSIS — Z23 ENCOUNTER FOR IMMUNIZATION: Primary | ICD-10-CM

## 2021-05-03 PROCEDURE — 91301 SARS-COV-2 / COVID-19 MRNA VACCINE (MODERNA) 100 MCG: CPT

## 2021-05-03 PROCEDURE — 0012A SARS-COV-2 / COVID-19 MRNA VACCINE (MODERNA) 100 MCG: CPT

## 2021-05-25 DIAGNOSIS — I10 BENIGN ESSENTIAL HYPERTENSION: ICD-10-CM

## 2021-06-01 RX ORDER — AMLODIPINE AND OLMESARTAN MEDOXOMIL 5; 20 MG/1; MG/1
TABLET ORAL
Qty: 90 TABLET | Refills: 3 | Status: SHIPPED | OUTPATIENT
Start: 2021-06-01 | End: 2022-06-14

## 2021-12-07 ENCOUNTER — IMMUNIZATIONS (OUTPATIENT)
Dept: FAMILY MEDICINE CLINIC | Facility: HOSPITAL | Age: 61
End: 2021-12-07

## 2021-12-07 DIAGNOSIS — Z23 ENCOUNTER FOR IMMUNIZATION: Primary | ICD-10-CM

## 2021-12-07 PROCEDURE — 91306 COVID-19 MODERNA VACC 0.25 ML BOOSTER: CPT

## 2021-12-07 PROCEDURE — 0064A COVID-19 MODERNA VACC 0.25 ML BOOSTER: CPT

## 2022-01-05 ENCOUNTER — TELEPHONE (OUTPATIENT)
Dept: FAMILY MEDICINE CLINIC | Facility: CLINIC | Age: 62
End: 2022-01-05

## 2022-01-05 NOTE — TELEPHONE ENCOUNTER
LMOM for patient to call the office back  Patient has not been seen since 7/2020      Miles Johnson LPN

## 2022-01-05 NOTE — TELEPHONE ENCOUNTER
Traveling to [place] and [facility] in a couple of weeks  Can we prescribe something for these flights  She is very nervous about flying        [facility]

## 2022-01-06 NOTE — TELEPHONE ENCOUNTER
Joseph set her up with a virtual appt to so we can have an appt on the books for her before writing the controlled substance

## 2022-01-10 ENCOUNTER — TELEMEDICINE (OUTPATIENT)
Dept: FAMILY MEDICINE CLINIC | Facility: CLINIC | Age: 62
End: 2022-01-10
Payer: COMMERCIAL

## 2022-01-10 DIAGNOSIS — F41.9 ANXIETY: Primary | ICD-10-CM

## 2022-01-10 PROCEDURE — 99213 OFFICE O/P EST LOW 20 MIN: CPT | Performed by: FAMILY MEDICINE

## 2022-01-10 RX ORDER — ALPRAZOLAM 0.5 MG/1
TABLET ORAL
Qty: 10 TABLET | Refills: 0 | Status: SHIPPED | OUTPATIENT
Start: 2022-01-10

## 2022-01-10 NOTE — PROGRESS NOTES
Virtual Regular Visit    Verification of patient location:    Patient is located in the following state in which I hold an active license [place]      Assessment/Plan:    Problem List Items Addressed This Visit        Other    Anxiety - Primary    Relevant Medications    ALPRAZolam (XANAX) 0 5 mg tablet               Reason for visit is   Chief Complaint   Patient presents with    Virtual Regular Visit        Encounter provider Wallace Vasquez DO    Provider located at [address]  [address] [facility] [address]  [place] [id number]      Recent Visits  Date Type Provider Dept   01/05/22 Telephone Wallace Vasquez, [address] recent visits within past 7 days and meeting all other requirements  Today's Visits  Date Type Provider Dept   01/10/22 [facility] [place], [address] today's visits and meeting all other requirements  Future Appointments  No visits were found meeting these conditions  Showing future appointments within next 150 days and meeting all other requirements       The patient was identified by name and date of birth  Albert Zee was informed that this is a telemedicine visit and that the visit is being conducted through [facility] and patient was informed that this is not a secure, HIPAA-compliant platform  She agrees to proceed     My office door was closed  No one else was in the room  She acknowledged consent and understanding of privacy and security of the video platform  The patient has agreed to participate and understands they can discontinue the visit at any time  Patient is aware this is a billable service  Subjective  Albert Zee is a 62 y o  female          Pt is being seen virtually today requesting medications to calm her down for flying  Pt states years ago she was given things for flying  Pt states she gets anxious when she flies - always did and states adding the mask to the works its worse Past Medical History:   Diagnosis Date    Thyroid cancer ([facility] [address] ) 1995       No past surgical history on file  Current Outpatient Medications   Medication Sig Dispense Refill    ALPRAZolam (XANAX) 0 5 mg tablet Take one tab 20 min prior to flight 10 tablet 0    amlodipine-olmesartan (CALVIN) 5-20 MG TAKE 1 TABLET DAILY 90 tablet 3    aspirin (ECOTRIN LOW STRENGTH) 81 mg EC tablet Take 81 mg by mouth daily      levothyroxine (SYNTHROID) 125 mcg tablet Take by mouth daily      levothyroxine 112 mcg tablet Take 112 mcg by mouth daily      norethindrone-ethinyl estradiol (FEMHRT 1/5) 1-5 MG-MCG TABS        No current facility-administered medications for this visit  No Known Allergies    Review of Systems   Psychiatric/Behavioral: The patient is nervous/anxious  Video Exam    There were no vitals filed for this visit  Physical Exam  Psychiatric:         Mood and Affect: Mood is anxious  I spent 10 minutes directly with the patient during this visit    VIRTUAL VISIT Dannielle 84 verbally agrees to participate in [employer]  Pt is aware that [employer] could be limited without vital signs or the ability to perform a full hands-on physical exam  Paige De Anda understands she or the provider may request at any time to terminate the video visit and request the patient to seek care or treatment in person

## 2022-06-14 DIAGNOSIS — I10 BENIGN ESSENTIAL HYPERTENSION: ICD-10-CM

## 2022-06-14 RX ORDER — AMLODIPINE AND OLMESARTAN MEDOXOMIL 5; 20 MG/1; MG/1
TABLET ORAL
Qty: 90 TABLET | Refills: 3 | Status: SHIPPED | OUTPATIENT
Start: 2022-06-14

## 2022-08-30 ENCOUNTER — OFFICE VISIT (OUTPATIENT)
Dept: FAMILY MEDICINE CLINIC | Facility: CLINIC | Age: 62
End: 2022-08-30
Payer: COMMERCIAL

## 2022-08-30 VITALS
DIASTOLIC BLOOD PRESSURE: 70 MMHG | BODY MASS INDEX: 25.66 KG/M2 | OXYGEN SATURATION: 99 % | WEIGHT: 154 LBS | HEART RATE: 76 BPM | HEIGHT: 65 IN | RESPIRATION RATE: 18 BRPM | SYSTOLIC BLOOD PRESSURE: 106 MMHG | TEMPERATURE: 98.2 F

## 2022-08-30 DIAGNOSIS — E03.9 HYPOTHYROIDISM, UNSPECIFIED TYPE: Primary | ICD-10-CM

## 2022-08-30 DIAGNOSIS — Z12.11 SCREEN FOR COLON CANCER: ICD-10-CM

## 2022-08-30 DIAGNOSIS — E78.2 MIXED HYPERLIPIDEMIA: ICD-10-CM

## 2022-08-30 DIAGNOSIS — Z00.00 WELL ADULT EXAM: Primary | ICD-10-CM

## 2022-08-30 DIAGNOSIS — I10 BENIGN ESSENTIAL HYPERTENSION: ICD-10-CM

## 2022-08-30 DIAGNOSIS — E03.9 HYPOTHYROIDISM, UNSPECIFIED TYPE: ICD-10-CM

## 2022-08-30 DIAGNOSIS — Z13.6 SCREENING FOR CARDIOVASCULAR CONDITION: ICD-10-CM

## 2022-08-30 PROCEDURE — 99396 PREV VISIT EST AGE 40-64: CPT | Performed by: FAMILY MEDICINE

## 2022-08-30 PROCEDURE — 3725F SCREEN DEPRESSION PERFORMED: CPT | Performed by: FAMILY MEDICINE

## 2022-08-30 NOTE — PROGRESS NOTES
FAMILY PRACTICE HEALTH MAINTENANCE OFFICE VISIT  [facility] [place] DEPT  OF [facility]    NAME: Prabhakar Kearns  AGE: 65 y o  SEX: female  : 1960     DATE: 2022    Assessment and Plan     1  Well adult exam    2  Hypothyroidism, unspecified type  -     TSH, 3rd generation; Future  -     TSH, 3rd generation; Future; Expected date: 2023    3  Benign essential hypertension  Assessment & Plan:  Offered to lower the dose does not really want to do that    Orders:  -     Comprehensive metabolic panel; Future; Expected date: 2023    4  Mixed hyperlipidemia  Assessment & Plan:  Pt taked red rice yeast      5  Screening for cardiovascular condition  -     Comprehensive metabolic panel; Future  -     Lipid Panel with Direct LDL reflex; Future    6  Screen for colon cancer  -     [place]    7  BMI 25 0-25 9,adult        Pt advised on follow up every 6 months for bp, thyroid and lipids    Patient Counseling:   Nutrition: Stressed importance of a well balanced diet, moderation of sodium/saturated fat, caloric balance and sufficient intake of fiber  Exercise: Stressed the importance of regular exercise with a goal of 150 minutes per week  Dental Health: Discussed daily flossing and brushing and regular dental visits     Immunizations reviewed: Declined recommended vaccinations  Discussed benefits of:  Colon Cancer Screening, Mammogram , Cervical Cancer screening and Screening labs  BMI Counseling: Body mass index is 25 63 kg/m²  Discussed with patient's BMI with her  The BMI is above normal  Nutrition recommendations include reducing portion sizes  Return in about 6 months (around 2023) for Recheck          Chief Complaint     Chief Complaint   Patient presents with    Physical Exam     rmklpn       History of Present Illness     Pt is sched for a full physical  Pt states she is really here because she wants me to take over her mgmt of hypothyroidism  Pt is unsure of her current dose , she thinks she is on 100 but 112 is in the chart    Pt states her last mammo was in elpidio    Pt sees Demario Rose - Dr Modesto Rausch      Well Adult Physical   Patient here for a comprehensive physical exam       Diet and Physical Activity  Diet: well balanced diet  Exercise: frequently      Depression Screen  PHQ-2/9 Depression Screening    Little interest or pleasure in doing things: 0 - not at all  Feeling down, depressed, or hopeless: 0 - not at all  PHQ-2 Score: 0  PHQ-2 Interpretation: Negative depression screen          General Health  Hearing: Normal:  bilateral  Vision: wears glasses  Dental: regular dental visits    Reproductive Health  No issues       The following portions of the patient's history were reviewed and updated as appropriate: allergies, current medications, past family history, past medical history, past social history, past surgical history and problem list     Review of Systems     Review of Systems   Constitutional: Negative  Negative for activity change, appetite change, chills, diaphoresis and fatigue  HENT: Negative  Negative for dental problem, ear pain, sinus pressure and sore throat  Eyes: Negative  Negative for photophobia, pain, discharge, redness, itching and visual disturbance  Respiratory: Negative for apnea and chest tightness  Cardiovascular: Negative  Negative for chest pain, palpitations and leg swelling  Gastrointestinal: Negative  Negative for abdominal distention, abdominal pain, constipation and diarrhea  Endocrine: Negative  Negative for cold intolerance and heat intolerance  Genitourinary: Negative  Negative for difficulty urinating and dyspareunia  Musculoskeletal: Negative  Negative for arthralgias and back pain  Skin: Negative  Allergic/Immunologic: Negative for environmental allergies  Neurological: Negative  Negative for dizziness  Psychiatric/Behavioral: Negative  Negative for agitation         Past Medical History     Past Medical History:   Diagnosis Date    Thyroid cancer ([facility] [address] ) 1995       Past Surgical History     History reviewed  No pertinent surgical history  Social History     Social History     Socioeconomic History    Marital status: /Civil Union     Spouse name: None    Number of children: None    Years of education: None    Highest education level: None   Occupational History    None   Tobacco Use    Smoking status: Never Smoker    Smokeless tobacco: Never Used   Vaping Use    Vaping Use: Never used   Substance and Sexual Activity    Alcohol use: None     Comment: 3 glasses wine weekly    Drug use: Never    Sexual activity: None   Other Topics Concern    None   Social History Narrative    None     Social Determinants of Health     Financial Resource Strain: Not on file   Food Insecurity: Not on file   Transportation Needs: Not on file   Physical Activity: Not on file   Stress: Not on file   Social Connections: Not on file   Intimate Partner Violence: Not on file   Housing Stability: Not on file       Family History     Family History   Problem Relation Age of Onset    Lung cancer Mother     Alzheimer's disease Paternal Aunt     Mental illness Neg Hx        Current Medications       Current Outpatient Medications:     ALPRAZolam (XANAX) 0 5 mg tablet, Take one tab 20 min prior to flight, Disp: 10 tablet, Rfl: 0    amlodipine-olmesartan (CALVIN) 5-20 MG, TAKE 1 TABLET DAILY, Disp: 90 tablet, Rfl: 3    aspirin (ECOTRIN LOW STRENGTH) 81 mg EC tablet, Take 81 mg by mouth daily, Disp: , Rfl:     levothyroxine 112 mcg tablet, Take 112 mcg by mouth daily, Disp: , Rfl:     norethindrone-ethinyl estradiol (FEMHRT 1/5) 1-5 MG-MCG TABS, , Disp: , Rfl:      Allergies     No Known Allergies    Objective     /70   Pulse 76   Temp 98 2 °F (36 8 °C)   Resp 18   Ht 5' 5" (1 651 m)   Wt 69 9 kg (154 lb)   SpO2 99%   BMI 25 63 kg/m²      Physical Exam  Vitals and nursing note reviewed  Constitutional:       General: She is not in acute distress  Appearance: She is well-developed  She is not diaphoretic  HENT:      Head: Normocephalic and atraumatic  Right Ear: External ear normal       Left Ear: External ear normal       Nose: Nose normal       Mouth/Throat:      Pharynx: No oropharyngeal exudate  Eyes:      General: No scleral icterus  Right eye: No discharge  Left eye: No discharge  Pupils: Pupils are equal, round, and reactive to light  Neck:      Thyroid: No thyromegaly  Cardiovascular:      Rate and Rhythm: Normal rate  Heart sounds: Normal heart sounds  No murmur heard  Pulmonary:      Effort: Pulmonary effort is normal  No respiratory distress  Breath sounds: Normal breath sounds  No wheezing  Abdominal:      General: Bowel sounds are normal  There is no distension  Palpations: Abdomen is soft  There is no mass  Tenderness: There is no abdominal tenderness  There is no guarding or rebound  Musculoskeletal:         General: Normal range of motion  Skin:     General: Skin is warm and dry  Findings: No erythema or rash  Neurological:      Mental Status: She is alert        Coordination: Coordination normal       Deep Tendon Reflexes: Reflexes normal    Psychiatric:         Behavior: Behavior normal             Visual Acuity Screening    Right eye Left eye Both eyes   Without correction:      With correction: 20/25 20/25 [address], [address]

## 2022-08-30 NOTE — PROGRESS NOTES
Assessment/Plan:    There are no diagnoses linked to this encounter  There are no Patient Instructions on file for this visit  No follow-ups on file  Subjective:      Patient ID: Mike Hein is a 65 y o  female  Chief Complaint   Patient presents with    Physical Exam     rmklpn       HPI    The following portions of the patient's history were reviewed and updated as appropriate: allergies, current medications, past family history, past medical history, past social history, past surgical history and problem list     Review of Systems      Current Outpatient Medications   Medication Sig Dispense Refill    ALPRAZolam (XANAX) 0 5 mg tablet Take one tab 20 min prior to flight 10 tablet 0    amlodipine-olmesartan (CALVIN) 5-20 MG TAKE 1 TABLET DAILY 90 tablet 3    aspirin (ECOTRIN LOW STRENGTH) 81 mg EC tablet Take 81 mg by mouth daily      levothyroxine 112 mcg tablet Take 112 mcg by mouth daily      norethindrone-ethinyl estradiol (FEMHRT 1/5) 1-5 MG-MCG TABS       levothyroxine 125 mcg tablet Take by mouth daily (Patient not taking: Reported on 8/30/2022)       No current facility-administered medications for this visit         Objective:    /70   Pulse 76   Temp 98 2 °F (36 8 °C)   Resp 18   Ht 5' 5" (1 651 m)   Wt 69 9 kg (154 lb)   SpO2 99%   BMI 25 63 kg/m²        Physical Exam           Julia Roberts DO

## 2022-08-30 NOTE — TELEPHONE ENCOUNTER
Patient called to let you know her dose 100 mcg     mcg     [facility]

## 2022-08-30 NOTE — PATIENT INSTRUCTIONS
Weight Management   AMBULATORY CARE:   Why it is important to manage your weight:  Being overweight increases your risk of health conditions such as heart disease, high blood pressure, type 2 diabetes, and certain types of cancer  It can also increase your risk for osteoarthritis, sleep apnea, and other respiratory problems  Aim for a slow, steady weight loss  Even a small amount of weight loss can lower your risk of health problems  Risks of being overweight:  Extra weight can cause many health problems, including the following:  · Diabetes (high blood sugar level)    · High blood pressure or high cholesterol    · Heart disease    · Stroke    · Gallbladder or liver disease    · Cancer of the colon, breast, prostate, liver, or kidney    · Sleep apnea    · Arthritis or gout    Screening  is done to check for health conditions before you have signs or symptoms  If you are 28to 79years old, your blood sugar level may be checked every 3 years for signs of prediabetes or diabetes  Your healthcare provider will check your blood pressure at each visit  High blood pressure can lead to a stroke or other problems  Your provider may check for signs of heart disease, cancer, or other health problems  How to lose weight safely:  A safe and healthy way to lose weight is to eat fewer calories and get regular exercise  · You can lose up about 1 pound a week by decreasing the number of calories you eat by 500 calories each day  You can decrease calories by eating smaller portion sizes or by cutting out high-calorie foods  Read labels to find out how many calories are in the foods you eat  · You can also burn calories with exercise such as walking, swimming, or biking  You will be more likely to keep weight off if you make these changes part of your lifestyle  Exercise at least 30 minutes per day on most days of the week   You can also fit in more physical activity by taking the stairs instead of the elevator or parking farther away from stores  Ask your healthcare provider about the best exercise plan for you  Healthy meal plan for weight management:  A healthy meal plan includes a variety of foods, contains fewer calories, and helps you stay healthy  A healthy meal plan includes the following:     · Eat whole-grain foods more often  A healthy meal plan should contain fiber  Fiber is the part of grains, fruits, and vegetables that is not broken down by your body  Whole-grain foods are healthy and provide extra fiber in your diet  Some examples of whole-grain foods are whole-wheat breads and pastas, oatmeal, brown rice, and bulgur  · Eat a variety of vegetables every day  Include dark, leafy greens such as spinach, kale, parth greens, and mustard greens  Eat yellow and orange vegetables such as carrots, sweet potatoes, and winter squash  · Eat a variety of fruits every day  Choose fresh or canned fruit (canned in its own juice or light syrup) instead of juice  Fruit juice has very little or no fiber  · Eat low-fat dairy foods  Drink fat-free (skim) milk or 1% milk  Eat fat-free yogurt and low-fat cottage cheese  Try low-fat cheeses such as mozzarella and other reduced-fat cheeses  · Choose meat and other protein foods that are low in fat  Choose beans or other legumes such as split peas or lentils  Choose fish, skinless poultry (chicken or turkey), or lean cuts of red meat (beef or pork)  Before you cook meat or poultry, cut off any visible fat  · Use less fat and oil  Try baking foods instead of frying them  Add less fat, such as margarine, sour cream, regular salad dressing and mayonnaise to foods  Eat fewer high-fat foods  Some examples of high-fat foods include french fries, doughnuts, ice cream, and cakes  · Eat fewer sweets  Limit foods and drinks that are high in sugar  This includes candy, cookies, regular soda, and sweetened drinks  Ways to decrease calories:   · Eat smaller portions  ? Use a small plate with smaller servings  ? Do not eat second helpings  ? When you eat at a restaurant, ask for a box and place half of your meal in the box before you eat  ? Share an entrée with someone else  · Replace high-calorie snacks with healthy, low-calorie snacks  ? Choose fresh fruit, vegetables, fat-free rice cakes, or air-popped popcorn instead of potato chips, nuts, or chocolate  ? Choose water or calorie-free drinks instead of soda or sweetened drinks  · Do not shop for groceries when you are hungry  You may be more likely to make unhealthy food choices  Take a grocery list of healthy foods and shop after you have eaten  · Eat regular meals  Do not skip meals  Skipping meals can lead to overeating later in the day  This can make it harder for you to lose weight  Eat a healthy snack in place of a meal if you do not have time to eat a regular meal  Talk with a dietitian to help you create a meal plan and schedule that is right for you  Other things to consider as you try to lose weight:   · Be aware of situations that may give you the urge to overeat, such as eating while watching television  Find ways to avoid these situations  For example, read a book, go for a walk, or do crafts  · Meet with a weight loss support group or friends who are also trying to lose weight  This may help you stay motivated to continue working on your weight loss goals  © Copyright [employer] 2022 Information is for End User's use only and may not be sold, redistributed or otherwise used for commercial purposes  All illustrations and images included in CareNotes® are the copyrighted property of A D A M , Inc  or [facility] Uli Carmona   The above information is an  only  It is not intended as medical advice for individual conditions or treatments  Talk to your doctor, nurse or pharmacist before following any medical regimen to see if it is safe and effective for you

## 2022-08-31 ENCOUNTER — TELEPHONE (OUTPATIENT)
Dept: ADMINISTRATIVE | Facility: OTHER | Age: 62
End: 2022-08-31

## 2022-08-31 RX ORDER — LEVOTHYROXINE SODIUM 0.1 MG/1
100 TABLET ORAL DAILY
Qty: 90 TABLET | Refills: 1 | Status: SHIPPED | OUTPATIENT
Start: 2022-08-31 | End: 2022-09-20 | Stop reason: SDUPTHER

## 2022-08-31 NOTE — LETTER
Procedure Request Form: Mammogram      Date Requested: 22  Patient: Jignesh Grow  Patient : 1960   Referring Provider: Geovany Rushing, DO        Date of Procedure ______________________________       The above patient has informed us that they have completed their   most recent Mammogram at your facility  Please complete   this form and attach all corresponding procedure reports/results  Comments __________________________________________________________  ____________________________________________________________________  ____________________________________________________________________  ____________________________________________________________________    Facility Completing Procedure _________________________________________    Form Completed By (print name) _______________________________________      Signature __________________________________________________________      These reports are needed for  compliance  Please fax this completed form and a copy of the procedure report to our office located at [place] [address] as soon as possible to [phone] attention [facility]: Phone [phone]    We thank you for your assistance in treating our mutual patient

## 2022-08-31 NOTE — TELEPHONE ENCOUNTER
----- Message from Wallace Vasquez DO sent at 8/30/2022  4:12 PM EDT -----  Regarding: breast  Last mammo was in jan, pt also had follow up studies a few weeks ago - Had them done at [facility] and her obgyn is dr Marlin Lowery

## 2022-09-01 NOTE — TELEPHONE ENCOUNTER
Upon review of the In Basket request and the patient's chart, initial outreach has been made via fax, please see Contacts section for details       Thank you  Stefania Mar MA

## 2022-09-02 NOTE — TELEPHONE ENCOUNTER
Upon review of the In Basket request we mammo for 8-22-22 updated    Any additional questions or concerns should be emailed to the Practice Liaisons via [email or web address] email, please do not reply via In Basket      Thank you  Stefania Mar MA

## 2022-09-17 LAB
ALBUMIN SERPL-MCNC: 4.5 G/DL (ref 3.6–5.1)
ALBUMIN/GLOB SERPL: 1.4 (CALC) (ref 1–2.5)
ALP SERPL-CCNC: 50 U/L (ref 37–153)
ALT SERPL-CCNC: 12 U/L (ref 6–29)
AST SERPL-CCNC: 19 U/L (ref 10–35)
BILIRUB SERPL-MCNC: 0.7 MG/DL (ref 0.2–1.2)
BUN SERPL-MCNC: 10 MG/DL (ref 7–25)
BUN/CREAT SERPL: NORMAL (CALC) (ref 6–22)
CALCIUM SERPL-MCNC: 9.3 MG/DL (ref 8.6–10.4)
CHLORIDE SERPL-SCNC: 103 MMOL/L (ref 98–110)
CHOLEST SERPL-MCNC: 208 MG/DL
CHOLEST/HDLC SERPL: 2.6 (CALC)
CO2 SERPL-SCNC: 26 MMOL/L (ref 20–32)
CREAT SERPL-MCNC: 0.71 MG/DL (ref 0.5–1.05)
GFR/BSA.PRED SERPLBLD CYS-BASED-ARV: 97 ML/MIN/1.73M2
GLOBULIN SER CALC-MCNC: 3.2 G/DL (CALC) (ref 1.9–3.7)
GLUCOSE SERPL-MCNC: 87 MG/DL (ref 65–99)
HDLC SERPL-MCNC: 80 MG/DL
LDLC SERPL CALC-MCNC: 112 MG/DL (CALC)
NONHDLC SERPL-MCNC: 128 MG/DL (CALC)
POTASSIUM SERPL-SCNC: 4.1 MMOL/L (ref 3.5–5.3)
PROT SERPL-MCNC: 7.7 G/DL (ref 6.1–8.1)
SODIUM SERPL-SCNC: 139 MMOL/L (ref 135–146)
TRIGL SERPL-MCNC: 73 MG/DL
TSH SERPL-ACNC: 5.53 MIU/L (ref 0.4–4.5)

## 2022-09-19 ENCOUNTER — TELEPHONE (OUTPATIENT)
Dept: FAMILY MEDICINE CLINIC | Facility: CLINIC | Age: 62
End: 2022-09-19

## 2022-09-19 DIAGNOSIS — E03.9 HYPOTHYROIDISM, UNSPECIFIED TYPE: Primary | ICD-10-CM

## 2022-09-19 NOTE — TELEPHONE ENCOUNTER
Please call pt looks like the thyroid and the lipids are a little off  I would like to increase her thyroid meds and saee if that helps the lipids    If she is ok with that plan I will escribe the new med and follow TSH in 6 weeks and the lipids in 6 months

## 2022-09-19 NOTE — TELEPHONE ENCOUNTER
[facility] and to mail the bloodwork  Let us know when ordered and we will print and mail  Thank you!   Jerry Giron MA

## 2022-09-20 RX ORDER — LEVOTHYROXINE SODIUM 112 UG/1
112 TABLET ORAL DAILY
Qty: 90 TABLET | Refills: 1 | Status: SHIPPED | OUTPATIENT
Start: 2022-09-20 | End: 2023-03-19

## 2022-09-21 ENCOUNTER — TELEPHONE (OUTPATIENT)
Dept: WOUND CARE | Facility: HOSPITAL | Age: 62
End: 2022-09-21

## 2022-09-21 LAB — COLOGUARD RESULT REPORTABLE: NEGATIVE

## 2022-09-21 NOTE — LETTER
November 16, 2022     [phone]      Dear Ms Bynum: Your cologuard test is negative  We recommend that you repeat the above test(s) in 3 years  If you have any questions or concerns, please don't hesitate to call           Sincerely,        Dylan Pinto, DO

## 2023-01-14 LAB — TSH SERPL DL<=0.005 MIU/L-ACNC: 2.56 UIU/ML (ref 0.45–4.5)

## 2023-03-01 DIAGNOSIS — E03.9 HYPOTHYROIDISM, UNSPECIFIED TYPE: ICD-10-CM

## 2023-03-01 RX ORDER — LEVOTHYROXINE SODIUM 112 UG/1
TABLET ORAL
Qty: 90 TABLET | Refills: 3 | Status: SHIPPED | OUTPATIENT
Start: 2023-03-01

## 2023-03-29 DIAGNOSIS — F41.9 ANXIETY: ICD-10-CM

## 2023-03-29 NOTE — TELEPHONE ENCOUNTER
Patient needs a new prescription she is flying to [facility] and needs a few more then the last time

## 2023-03-31 RX ORDER — ALPRAZOLAM 0.5 MG/1
TABLET ORAL
Qty: 10 TABLET | Refills: 0 | Status: SHIPPED | OUTPATIENT
Start: 2023-03-31

## 2023-06-09 DIAGNOSIS — I10 BENIGN ESSENTIAL HYPERTENSION: ICD-10-CM

## 2023-06-09 RX ORDER — AMLODIPINE AND OLMESARTAN MEDOXOMIL 5; 20 MG/1; MG/1
TABLET ORAL
Qty: 90 TABLET | Refills: 3 | Status: SHIPPED | OUTPATIENT
Start: 2023-06-09

## 2024-02-26 DIAGNOSIS — E03.9 HYPOTHYROIDISM, UNSPECIFIED TYPE: ICD-10-CM

## 2024-02-26 RX ORDER — LEVOTHYROXINE SODIUM 112 UG/1
TABLET ORAL
Qty: 90 TABLET | Refills: 3 | Status: SHIPPED | OUTPATIENT
Start: 2024-02-26

## 2024-03-18 ENCOUNTER — HOSPITAL ENCOUNTER (OUTPATIENT)
Dept: RADIOLOGY | Facility: HOSPITAL | Age: 64
Discharge: HOME | End: 2024-03-18
Payer: COMMERCIAL

## 2024-03-18 DIAGNOSIS — N93.9 VAGINAL BLEEDING: ICD-10-CM

## 2024-03-18 PROCEDURE — 76856 US EXAM PELVIC COMPLETE: CPT

## 2024-03-18 PROCEDURE — 76830 TRANSVAGINAL US NON-OB: CPT

## 2024-04-23 ENCOUNTER — CONSULT (OUTPATIENT)
Dept: FAMILY MEDICINE CLINIC | Facility: CLINIC | Age: 64
End: 2024-04-23
Payer: COMMERCIAL

## 2024-04-23 ENCOUNTER — TELEPHONE (OUTPATIENT)
Age: 64
End: 2024-04-23

## 2024-04-23 VITALS
TEMPERATURE: 97.2 F | WEIGHT: 155.6 LBS | SYSTOLIC BLOOD PRESSURE: 116 MMHG | HEIGHT: 65 IN | RESPIRATION RATE: 18 BRPM | BODY MASS INDEX: 25.92 KG/M2 | DIASTOLIC BLOOD PRESSURE: 70 MMHG | HEART RATE: 76 BPM

## 2024-04-23 DIAGNOSIS — I10 BENIGN ESSENTIAL HYPERTENSION: ICD-10-CM

## 2024-04-23 DIAGNOSIS — N63.20 MASS OF LEFT BREAST, UNSPECIFIED QUADRANT: Primary | ICD-10-CM

## 2024-04-23 DIAGNOSIS — E03.9 HYPOTHYROIDISM, UNSPECIFIED TYPE: ICD-10-CM

## 2024-04-23 PROCEDURE — 99214 OFFICE O/P EST MOD 30 MIN: CPT | Performed by: NURSE PRACTITIONER

## 2024-04-23 RX ORDER — AMLODIPINE AND OLMESARTAN MEDOXOMIL 5; 20 MG/1; MG/1
1 TABLET ORAL DAILY
Qty: 90 TABLET | Refills: 0 | Status: SHIPPED | OUTPATIENT
Start: 2024-04-23

## 2024-04-23 RX ORDER — LEVOTHYROXINE SODIUM 112 UG/1
112 TABLET ORAL DAILY
Qty: 90 TABLET | Refills: 3 | Status: SHIPPED | OUTPATIENT
Start: 2024-04-23

## 2024-04-23 NOTE — TELEPHONE ENCOUNTER
Oly from [facility] to report that there is no specific form for clearance. They  only need the note that the EKG is fine and the blood tests.     Please Fax the note at [phone]

## 2024-04-23 NOTE — PROGRESS NOTES
[facility] PRE-OPERATIVE EVALUATION  [facility]    NAME: Paige Bynum  AGE: 63 y.o. SEX: female  : 1960     DATE: 2024    [facility] Pre-Operative Evaluation      Chief Complaint: Pre-operative Evaluation     Surgery: breast lumpectomy  Anticipated Date of Surgery: 24  Surgeon: Dr. Del Rosario       History of Present Illness:     Here today for preop examination, labs and testing done prior.         Anesthesia:   choice/unknown  Bleeding Risk: no recent abnormal bleeding, no remote history of abnormal bleeding, and no use of Ca-channel blockers  Current Anti-platelet/anticoagulation medication: Aspirin    Assessment of Cardiac Risk:  Denies unstable or severe angina or MI in the last 6 weeks or history of stent placement in the last year   Denies decompensated heart failure (e.g. New onset heart failure, NYHA functional class IV heart failure, or worsening existing heart failure)  Denies significant arrhythmias such as high grade AV block, symptomatic ventricular arrhythmia, newly recognized ventricular tachycardia, supraventricular tachycardia with resting heart rate >100, or symptomatic bradycardia  Denies severe heart valve disease including aortic stenosis or symptomatic mitral stenosis     Exercise Capacity:  Able to walk 4 blocks without symptoms?: Yes  Able to walk 2 flights without symptoms?: Yes    Prior Anesthesia Reactions: No     Personal history of venous thromboembolic disease? No    History of steroid use for >2 weeks within last year? No         Review of Systems:     Review of Systems   Constitutional:  Negative for chills, fatigue and fever.   Respiratory:  Negative for cough, shortness of breath and wheezing.    Cardiovascular:  Negative for chest pain, palpitations and leg swelling.   Gastrointestinal:  Negative for abdominal pain, diarrhea, nausea and vomiting.   Skin:  Negative for rash.   Neurological:  Negative for dizziness  and headaches.       Current Problem List:     Patient Active Problem List   Diagnosis   • Abnormal mammogram   • Benign essential hypertension   • Hypothyroidism   • Mixed hyperlipidemia   • Pes planus, congenital   • Left hip pain   • Angiomyolipoma of both kidneys   • Anxiety       Allergies:     No Known Allergies    Current Medications:       Current Outpatient Medications:   •  ALPRAZolam (XANAX) 0.5 mg tablet, Take one tab 20 min prior to flight, Disp: 10 tablet, Rfl: 0  •  amlodipine-olmesartan (CALVIN) 5-20 MG, Take 1 tablet by mouth daily, Disp: 90 tablet, Rfl: 0  •  aspirin (ECOTRIN LOW STRENGTH) 81 mg EC tablet, Take 81 mg by mouth daily, Disp: , Rfl:   •  levothyroxine 112 mcg tablet, Take 1 tablet (112 mcg total) by mouth daily, Disp: 90 tablet, Rfl: 3    Past Medical History:       Past Medical History:   Diagnosis Date   • Thyroid cancer (HCC) 1995        Past Surgical History:   Procedure Laterality Date   • THYROIDECTOMY, PARTIAL Right         Family History   Problem Relation Age of Onset   • Lung cancer Mother    • Alzheimer's disease Paternal Aunt    • Mental illness Neg Hx         Social History     Socioeconomic History   • Marital status: /Civil Union     Spouse name: Not on file   • Number of children: Not on file   • Years of education: Not on file   • Highest education level: Not on file   Occupational History   • Not on file   Tobacco Use   • Smoking status: Never     Passive exposure: Past   • Smokeless tobacco: Never   Vaping Use   • Vaping status: Never Used   Substance and Sexual Activity   • Alcohol use: Not on file     Comment: 3 glasses wine weekly   • Drug use: Never   • Sexual activity: Not on file   Other Topics Concern   • Not on file   Social History Narrative   • Not on file     Social Determinants of Health     Financial Resource Strain: Not on file   Food Insecurity: Not on file   Transportation Needs: Not on file   Physical Activity: Not on file   Stress: Not on file  "  Social Connections: Not on file   Intimate Partner Violence: Not on file   Housing Stability: Not on file        Physical Exam:     /70   Pulse 76   Temp (!) 97.2 °F (36.2 °C)   Resp 18   Ht 5' 5\" (1.651 m)   Wt 70.6 kg (155 lb 9.6 oz)   BMI 25.89 kg/m²     Physical Exam  Vitals and nursing note reviewed.   Constitutional:       Appearance: Normal appearance. She is well-developed.   Eyes:      Conjunctiva/sclera: Conjunctivae normal.   Cardiovascular:      Rate and Rhythm: Normal rate and regular rhythm.      Pulses: Normal pulses.      Heart sounds: Normal heart sounds. No murmur heard.  Pulmonary:      Effort: Pulmonary effort is normal.      Breath sounds: Normal breath sounds.   Skin:     General: Skin is warm and dry.   Neurological:      Mental Status: She is alert.   Psychiatric:         Mood and Affect: Mood normal.         Behavior: Behavior normal.          Data:     Pre-operative work-up    Laboratory Results: I have personally reviewed the pertinent laboratory results/reports      EKG: I have personally reviewed pertinent reports.      No results found for this or any previous visit (from the past 672 hour(s)).        Assessment & Recommendations:     1. Mass of left breast, unspecified quadrant    2. Hypothyroidism, unspecified type  Assessment & Plan:  Labs reviewed, stable on Levothyroxine     Orders:  -     levothyroxine 112 mcg tablet; Take 1 tablet (112 mcg total) by mouth daily    3. Benign essential hypertension  Assessment & Plan:  Stable on current medications     Orders:  -     amlodipine-olmesartan (CALVIN) 5-20 MG; Take 1 tablet by mouth daily          Pre-Op Evaluation Assessment  63 y.o. female with planned surgery: as above.    Known risk factors for perioperative complications: None.        Current medications which may produce withdrawal symptoms if withheld perioperatively: none.    Pre-Op Evaluation Plan  1. Further preoperative workup as follows:   - None; no further " preoperative work-up is required    2. Medication Management/Recommendations:   - Patient should continue antihypertensive medications up through and including the day of surgery.   - Patient has been instructed to avoid aspirin containing medications or non-steroidal anti-inflammatory drugs for the week preceding surgery.    3. Prophylaxis for cardiac events with perioperative beta-blockers: not indicated.    4. Patient requires further consultation with: None    Clearance  Patient is CLEARED for surgery without any additional cardiac testing.     Fatou Franz [facility]  [address] [address]  Phone#  [phone]  Fax#  [phone]

## 2024-07-26 DIAGNOSIS — I10 BENIGN ESSENTIAL HYPERTENSION: ICD-10-CM

## 2024-07-26 RX ORDER — AMLODIPINE AND OLMESARTAN MEDOXOMIL 5; 20 MG/1; MG/1
1 TABLET ORAL DAILY
Qty: 100 TABLET | Refills: 1 | Status: SHIPPED | OUTPATIENT
Start: 2024-07-26

## 2025-01-22 DIAGNOSIS — I10 BENIGN ESSENTIAL HYPERTENSION: ICD-10-CM

## 2025-01-22 RX ORDER — AMLODIPINE AND OLMESARTAN MEDOXOMIL 5; 20 MG/1; MG/1
1 TABLET ORAL DAILY
Qty: 90 TABLET | Refills: 3 | Status: SHIPPED | OUTPATIENT
Start: 2025-01-22

## 2025-03-21 DIAGNOSIS — E03.9 HYPOTHYROIDISM, UNSPECIFIED TYPE: ICD-10-CM

## 2025-03-21 RX ORDER — LEVOTHYROXINE SODIUM 112 UG/1
112 TABLET ORAL DAILY
Qty: 90 TABLET | Refills: 0 | Status: SHIPPED | OUTPATIENT
Start: 2025-03-21

## 2025-06-22 DIAGNOSIS — E03.9 HYPOTHYROIDISM, UNSPECIFIED TYPE: ICD-10-CM

## 2025-06-23 RX ORDER — LEVOTHYROXINE SODIUM 112 UG/1
112 TABLET ORAL DAILY
Qty: 90 TABLET | Refills: 0 | Status: SHIPPED | OUTPATIENT
Start: 2025-06-23

## 2025-07-16 ENCOUNTER — TELEPHONE (OUTPATIENT)
Dept: FAMILY MEDICINE CLINIC | Facility: CLINIC | Age: 65
End: 2025-07-16

## 2025-07-16 DIAGNOSIS — Z12.31 ENCOUNTER FOR SCREENING MAMMOGRAM FOR HIGH-RISK PATIENT: Primary | ICD-10-CM

## 2025-07-16 NOTE — TELEPHONE ENCOUNTER
Please call pt - looks like she is due for her Mammogram, I placed the order in the chart - she should sched and have done

## 2025-07-17 NOTE — TELEPHONE ENCOUNTER
Patient called in to let us know that she had breast surgery about a year ago and that she gets a mammo every 6 months at [facility].  No further action.

## 2025-07-18 NOTE — TELEPHONE ENCOUNTER
Left a message for patient to give us a call back. When she calls back can you please get her consent to call and get a copy of her last mammo. We also need to know the location so if you can get a phone number that would be helpful.  Sabiha Wolff MA

## 2025-07-28 NOTE — TELEPHONE ENCOUNTER
Left Pt. A voicemail to call back, when pt calls back please relay the provider message-[facility]